# Patient Record
Sex: FEMALE
[De-identification: names, ages, dates, MRNs, and addresses within clinical notes are randomized per-mention and may not be internally consistent; named-entity substitution may affect disease eponyms.]

---

## 2020-04-04 PROBLEM — Z00.00 ENCOUNTER FOR PREVENTIVE HEALTH EXAMINATION: Status: ACTIVE | Noted: 2020-04-04

## 2020-04-05 ENCOUNTER — APPOINTMENT (OUTPATIENT)
Dept: DISASTER EMERGENCY | Facility: CLINIC | Age: 70
End: 2020-04-05
Payer: MEDICARE

## 2020-04-05 VITALS
TEMPERATURE: 98.5 F | DIASTOLIC BLOOD PRESSURE: 70 MMHG | HEART RATE: 82 BPM | SYSTOLIC BLOOD PRESSURE: 130 MMHG | OXYGEN SATURATION: 98 %

## 2020-04-05 DIAGNOSIS — R68.89 OTHER GENERAL SYMPTOMS AND SIGNS: ICD-10-CM

## 2020-04-05 PROCEDURE — ZZZZZ: CPT

## 2020-04-06 LAB — SARS-COV-2 N GENE NPH QL NAA+PROBE: NOT DETECTED

## 2021-08-04 ENCOUNTER — APPOINTMENT (OUTPATIENT)
Dept: OTOLARYNGOLOGY | Facility: CLINIC | Age: 71
End: 2021-08-04
Payer: MEDICARE

## 2021-08-04 VITALS — HEART RATE: 93 BPM | DIASTOLIC BLOOD PRESSURE: 72 MMHG | SYSTOLIC BLOOD PRESSURE: 128 MMHG

## 2021-08-04 VITALS — BODY MASS INDEX: 18.88 KG/M2 | HEIGHT: 61 IN | WEIGHT: 100 LBS

## 2021-08-04 DIAGNOSIS — H91.92 UNSPECIFIED HEARING LOSS, LEFT EAR: ICD-10-CM

## 2021-08-04 DIAGNOSIS — H93.8X2 OTHER SPECIFIED DISORDERS OF LEFT EAR: ICD-10-CM

## 2021-08-04 DIAGNOSIS — H93.12 TINNITUS, LEFT EAR: ICD-10-CM

## 2021-08-04 DIAGNOSIS — H92.22 OTORRHAGIA, LEFT EAR: ICD-10-CM

## 2021-08-04 PROCEDURE — 99204 OFFICE O/P NEW MOD 45 MIN: CPT | Mod: 25

## 2021-08-04 PROCEDURE — 92557 COMPREHENSIVE HEARING TEST: CPT

## 2021-08-04 PROCEDURE — 92567 TYMPANOMETRY: CPT

## 2021-08-04 PROCEDURE — 92504 EAR MICROSCOPY EXAMINATION: CPT

## 2021-08-04 NOTE — REASON FOR VISIT
[Initial Evaluation] : an initial evaluation for [Spouse] : spouse [FreeTextEntry2] : initial visit for Left ear perforation

## 2021-08-04 NOTE — HISTORY OF PRESENT ILLNESS
[de-identified] : 71 year old female initial visit for Left ear perforation\par Reports trauma to ear while using Q-tip to clean ear\par States symptoms present almost 2 weeks ago\par Reports otorrhagia at the time, went to an ENT to have ears cleaned\par prescribed Ciprodex drops, with oral antibiotics, with some relief\par Currently reports Left tinnitus with ear pressure and hearing loss\par Reports imbalance with some dizziness and mild headaches, mostly with sudden movement\par Denies otalgia, otorrhea and recent fevers

## 2021-08-04 NOTE — PHYSICAL EXAM
[Binocular Microscopic Exam] : Binocular microscopic exam was performed [Midline] : trachea located in midline position [Normal] : no rashes [Hearing Loss Right Only] : normal [Hearing Loss Left Only] : normal [de-identified] : dry central perf, 20%

## 2021-08-04 NOTE — DATA REVIEWED
[de-identified] : A pure tone audiogram with speech discrimination and tympanometry was ordered and performed due to patient's acute onset of left hearing loss and TM perforation no recent audiogram performed to measure these changes or establish a cause at our facility or elsewhere.\par \par I have independently reviewed the patient's audiogram from today and my findings include L CHL SRT to 35dB (SRT on R is 15dB)\par \par

## 2021-08-04 NOTE — PROCEDURE
[Same] : same as the Pre Op Dx. [] : Binocular Microscopy [FreeTextEntry1] : L Tm perf [FreeTextEntry4] : none [FreeTextEntry6] : Rigid endoscope with video feedback was used to examine the ear canal, ear drum and visible middle ear landmarks & educate patient. Adequate exam/patient education would not have been possible without the use of an endoscope. Findings are described. Stills taken.\par \par

## 2021-09-15 ENCOUNTER — APPOINTMENT (OUTPATIENT)
Dept: OTOLARYNGOLOGY | Facility: CLINIC | Age: 71
End: 2021-09-15
Payer: MEDICARE

## 2021-09-15 VITALS — HEART RATE: 88 BPM | DIASTOLIC BLOOD PRESSURE: 77 MMHG | SYSTOLIC BLOOD PRESSURE: 122 MMHG

## 2021-09-15 VITALS — BODY MASS INDEX: 18.64 KG/M2 | WEIGHT: 100 LBS | HEIGHT: 61.5 IN

## 2021-09-15 PROCEDURE — 99213 OFFICE O/P EST LOW 20 MIN: CPT | Mod: 25

## 2021-09-15 PROCEDURE — 92504 EAR MICROSCOPY EXAMINATION: CPT

## 2021-09-17 NOTE — PHYSICAL EXAM
[Binocular Microscopic Exam] : Binocular microscopic exam was performed [Hearing Loss Right Only] : normal [Hearing Loss Left Only] : normal [de-identified] : dry central perf, 20%; migrated about 20 degrees anterior c/w prior [Midline] : trachea located in midline position [Normal] : no rashes

## 2021-09-17 NOTE — REASON FOR VISIT
[Subsequent Evaluation] : a subsequent evaluation for [Spouse] : spouse [FreeTextEntry2] : Left traumatic ear injury

## 2021-09-17 NOTE — HISTORY OF PRESENT ILLNESS
[de-identified] : 71 year old female follow up for Left traumatic ear injury, TM perforation\par History of Left CHL, no evidence of nerve related hearing loss\par Reports mild improvement, feels like 30-35% out of 100\par States continues to have tinnitus of Left, slight improvement\par Reports hearing has improved, no hearing devices used\par States feelings of imbalance and dizziness are intermittent with occasional headaches related to ears\par Denies otalgia, otorrhea, recent fevers and ear infections

## 2021-11-07 ENCOUNTER — TRANSCRIPTION ENCOUNTER (OUTPATIENT)
Age: 71
End: 2021-11-07

## 2021-11-10 ENCOUNTER — RESULT REVIEW (OUTPATIENT)
Age: 71
End: 2021-11-10

## 2021-11-27 ENCOUNTER — TRANSCRIPTION ENCOUNTER (OUTPATIENT)
Age: 71
End: 2021-11-27

## 2021-12-05 ENCOUNTER — TRANSCRIPTION ENCOUNTER (OUTPATIENT)
Age: 71
End: 2021-12-05

## 2021-12-14 ENCOUNTER — APPOINTMENT (OUTPATIENT)
Dept: OTOLARYNGOLOGY | Facility: CLINIC | Age: 71
End: 2021-12-14
Payer: MEDICARE

## 2021-12-14 VITALS — HEIGHT: 65 IN | WEIGHT: 98 LBS | BODY MASS INDEX: 16.33 KG/M2

## 2021-12-14 PROCEDURE — 92504 EAR MICROSCOPY EXAMINATION: CPT

## 2021-12-14 PROCEDURE — 99213 OFFICE O/P EST LOW 20 MIN: CPT

## 2021-12-14 RX ORDER — NAPROXEN 500 MG/1
TABLET ORAL
Refills: 0 | Status: DISCONTINUED | COMMUNITY
End: 2021-12-14

## 2021-12-14 RX ORDER — IBUPROFEN 800 MG/1
TABLET, FILM COATED ORAL
Refills: 0 | Status: DISCONTINUED | COMMUNITY
End: 2021-12-14

## 2021-12-15 NOTE — PHYSICAL EXAM
[Midline] : trachea located in midline position [Binocular Microscopic Exam] : Binocular microscopic exam was performed [Normal] : the left mastoid was normal [Hearing Loss Right Only] : normal [Hearing Loss Left Only] : normal [de-identified] : dry central perf, 30% no change from prior

## 2021-12-15 NOTE — HISTORY OF PRESENT ILLNESS
[de-identified] : 71 year old female follow up for Left traumatic ear injury, TM perforation. History of Left CHL, no evidence of nerve related hearing loss. \par Reports mild improvement, feels like 30-35% out of 100\par States continues to have constant tinnitus of Left ear, slight improvement\par Reports hearing has improved, no hearing devices used\par States feelings of imbalance and dizziness are intermittent with occasional headaches related to ears\par Patient reports post nasal drip and coughing that started about 1 month ago-patient reports taking Robitussin with relief. \par Patient reports constant headaches that patient reports makes her dizzy. Patient reports having syncope episode 2 weeks ago. \par Denies otalgia, otorrhea, recent fevers and ear infections

## 2022-03-10 ENCOUNTER — APPOINTMENT (OUTPATIENT)
Dept: CARDIOLOGY | Facility: CLINIC | Age: 72
End: 2022-03-10
Payer: MEDICARE

## 2022-03-10 ENCOUNTER — NON-APPOINTMENT (OUTPATIENT)
Age: 72
End: 2022-03-10

## 2022-03-10 VITALS
DIASTOLIC BLOOD PRESSURE: 68 MMHG | HEIGHT: 65 IN | OXYGEN SATURATION: 97 % | BODY MASS INDEX: 16.66 KG/M2 | HEART RATE: 81 BPM | WEIGHT: 100 LBS | SYSTOLIC BLOOD PRESSURE: 118 MMHG

## 2022-03-10 DIAGNOSIS — R00.2 PALPITATIONS: ICD-10-CM

## 2022-03-10 DIAGNOSIS — R73.03 PREDIABETES.: ICD-10-CM

## 2022-03-10 DIAGNOSIS — Z80.42 FAMILY HISTORY OF MALIGNANT NEOPLASM OF PROSTATE: ICD-10-CM

## 2022-03-10 DIAGNOSIS — Z80.3 FAMILY HISTORY OF MALIGNANT NEOPLASM OF BREAST: ICD-10-CM

## 2022-03-10 DIAGNOSIS — E78.5 HYPERLIPIDEMIA, UNSPECIFIED: ICD-10-CM

## 2022-03-10 DIAGNOSIS — Z87.891 PERSONAL HISTORY OF NICOTINE DEPENDENCE: ICD-10-CM

## 2022-03-10 PROCEDURE — 99204 OFFICE O/P NEW MOD 45 MIN: CPT

## 2022-03-10 PROCEDURE — 93000 ELECTROCARDIOGRAM COMPLETE: CPT

## 2022-03-10 NOTE — ASSESSMENT
[FreeTextEntry1] : 72 year old female with feeling of racing heart beat when she anxious or exertion , possible symptomatic sinus tachycardia vs rule out SVT , patient anxious under mental stress test   recommend  to increase fluid intake , echo to assess ventricular function , exercise stress test , holter monitor to correlate symptoms with underlying cardiac rhythm \par \par \par Dizziness  with deafness  possible vestibular origin  doubt cardiac origin  will obtain echo , exercise stress test \par \par \par Mild hyperlipidemia , advised the patient to follow diet restriction , will obtain her recent lipid profile \par \par \par \par  follow up after above tests

## 2022-03-10 NOTE — HISTORY OF PRESENT ILLNESS
[FreeTextEntry1] : 72 year old female who came for  cardiac evaluation with complain that heart is racing feeling right side of chest pain  for last one month , on and off ,during physical activity ,  today he felt when she woke up from bed , some shortness of breath , lasted for few minutes , patient  is under mental stress lately , \par \par patient does get shortness of breath  on unusual activity denies any chest pain ,   Patient also give episodes dizziness , some times while walking , especially after noons ,does have left sided deafness , Patient denies any palpitations during dizziness ,  patient had this symptoms for  3 to 3 months ,  Patient is not sleeping well lately .\par \par Patient blood work showed  pre renal azotemia    no lipids available , prior ekg reviewed \par \par

## 2022-03-10 NOTE — REVIEW OF SYSTEMS
[Dyspnea on exertion] : dyspnea during exertion [Palpitations] : palpitations [Dizziness] : dizziness [Fever] : no fever [Chills] : no chills [Feeling Fatigued] : not feeling fatigued [Blurry Vision] : no blurred vision [Discharge From Ears] : no discharge from the ears [Sinus Pressure] : no sinus pressure [SOB] : no shortness of breath [Lower Ext Edema] : no extremity edema [Leg Claudication] : no intermittent leg claudication [PND] : no PND [Syncope] : no syncope [Cough] : no cough [Wheezing] : no wheezing [Abdominal Pain] : no abdominal pain [Blood in Stool] : no blood in stool [Rash] : no rash [Skin Lesions] : no skin lesions [Tremor] : no tremor was seen [Convulsions] : no convulsions [Tingling (Paresthesia)] : no tingling [Limb Weakness (Paresis)] : no limb weakness (Paresis) [Confusion] : no confusion was observed [Under Stress] : not under stress [Easy Bleeding] : no tendency for easy bleeding [de-identified] : as per HPI

## 2022-03-10 NOTE — PHYSICAL EXAM
[Well Developed] : well developed [Well Nourished] : well nourished [No Acute Distress] : no acute distress [Normal Conjunctiva] : normal conjunctiva [Normal Venous Pressure] : normal venous pressure [No Carotid Bruit] : no carotid bruit [Normal S1, S2] : normal S1, S2 [No Murmur] : no murmur [No Rub] : no rub [No Gallop] : no gallop [Clear Lung Fields] : clear lung fields [Good Air Entry] : good air entry [No Respiratory Distress] : no respiratory distress  [Soft] : abdomen soft [Non Tender] : non-tender [Normal Bowel Sounds] : normal bowel sounds [Normal Gait] : normal gait [No Edema] : no edema [No Cyanosis] : no cyanosis [No Clubbing] : no clubbing [No Varicosities] : no varicosities [Normal Radial B/L] : normal radial B/L [Normal PT B/L] : normal PT B/L [No Rash] : no rash [No Skin Lesions] : no skin lesions [Moves all extremities] : moves all extremities [No Focal Deficits] : no focal deficits [Normal Speech] : normal speech [Alert and Oriented] : alert and oriented [Normal memory] : normal memory

## 2022-03-15 ENCOUNTER — APPOINTMENT (OUTPATIENT)
Dept: OTOLARYNGOLOGY | Facility: CLINIC | Age: 72
End: 2022-03-15
Payer: MEDICARE

## 2022-03-15 VITALS
HEIGHT: 64 IN | BODY MASS INDEX: 17.07 KG/M2 | SYSTOLIC BLOOD PRESSURE: 108 MMHG | DIASTOLIC BLOOD PRESSURE: 74 MMHG | HEART RATE: 78 BPM | WEIGHT: 100 LBS

## 2022-03-15 DIAGNOSIS — H90.12 CONDUCTIVE HEARING LOSS, UNILATERAL, LEFT EAR, WITH UNRESTRICTED HEARING ON THE CONTRALATERAL SIDE: ICD-10-CM

## 2022-03-15 DIAGNOSIS — H72.92 UNSPECIFIED PERFORATION OF TYMPANIC MEMBRANE, LEFT EAR: ICD-10-CM

## 2022-03-15 PROCEDURE — 92567 TYMPANOMETRY: CPT

## 2022-03-15 PROCEDURE — 99213 OFFICE O/P EST LOW 20 MIN: CPT

## 2022-03-15 PROCEDURE — 92557 COMPREHENSIVE HEARING TEST: CPT

## 2022-03-15 PROCEDURE — 92504 EAR MICROSCOPY EXAMINATION: CPT

## 2022-03-15 NOTE — PHYSICAL EXAM
[Midline] : trachea located in midline position [Binocular Microscopic Exam] : Binocular microscopic exam was performed [Normal] : the left mastoid was normal [Hearing Loss Right Only] : normal [Hearing Loss Left Only] : normal [de-identified] : dry central perf, 15-20% decrease from prior

## 2022-03-15 NOTE — DATA REVIEWED
[de-identified] : An audiogram was ordered and performed including tympanometry, pure tones and speech, for patient's complaint of L Tm perf, hearing loss\par I have independently reviewed the patient's audiogram from today and my findings include L CHL, improved from prior\par

## 2022-03-15 NOTE — HISTORY OF PRESENT ILLNESS
[de-identified] : 72 year old female follow up for Left traumatic ear injury, TM perforation. History of Left CHL, no evidence of nerve related hearing loss. \par Reports some improvement in tinnitus- states it is intermittent tinnitus and resolves on its own. \par Reports hearing is stable since last visit. denies use of hearing aids. \par States feelings of imbalance and dizziness are intermittent \par Patient reports improvement in post nasal drip-thinks it is related to allergies-not currently taking allergy medication. \par Patient reports occasional headaches but denies dizziness.  \par Denies otalgia, otorrhea, recent fevers and ear infections

## 2022-09-15 ENCOUNTER — APPOINTMENT (OUTPATIENT)
Dept: OTOLARYNGOLOGY | Facility: CLINIC | Age: 72
End: 2022-09-15

## 2022-09-24 ENCOUNTER — NON-APPOINTMENT (OUTPATIENT)
Age: 72
End: 2022-09-24

## 2023-11-15 ENCOUNTER — NON-APPOINTMENT (OUTPATIENT)
Age: 73
End: 2023-11-15

## 2023-12-24 ENCOUNTER — NON-APPOINTMENT (OUTPATIENT)
Age: 73
End: 2023-12-24